# Patient Record
Sex: FEMALE
[De-identification: names, ages, dates, MRNs, and addresses within clinical notes are randomized per-mention and may not be internally consistent; named-entity substitution may affect disease eponyms.]

---

## 2024-09-17 ENCOUNTER — APPOINTMENT (OUTPATIENT)
Dept: ANTEPARTUM | Facility: CLINIC | Age: 34
End: 2024-09-17
Payer: COMMERCIAL

## 2024-09-17 ENCOUNTER — ASOB RESULT (OUTPATIENT)
Age: 34
End: 2024-09-17

## 2024-09-17 PROBLEM — Z00.00 ENCOUNTER FOR PREVENTIVE HEALTH EXAMINATION: Status: ACTIVE | Noted: 2024-09-17

## 2024-09-17 PROCEDURE — 76801 OB US < 14 WKS SINGLE FETUS: CPT | Mod: 59

## 2024-09-17 PROCEDURE — 36415 COLL VENOUS BLD VENIPUNCTURE: CPT

## 2024-09-17 PROCEDURE — 76813 OB US NUCHAL MEAS 1 GEST: CPT

## 2024-11-11 ENCOUNTER — APPOINTMENT (OUTPATIENT)
Dept: ANTEPARTUM | Facility: CLINIC | Age: 34
End: 2024-11-11
Payer: COMMERCIAL

## 2024-11-11 ENCOUNTER — ASOB RESULT (OUTPATIENT)
Age: 34
End: 2024-11-11

## 2024-11-11 PROCEDURE — 76811 OB US DETAILED SNGL FETUS: CPT

## 2025-01-31 ENCOUNTER — APPOINTMENT (OUTPATIENT)
Dept: ANTEPARTUM | Facility: CLINIC | Age: 35
End: 2025-01-31
Payer: COMMERCIAL

## 2025-01-31 ENCOUNTER — ASOB RESULT (OUTPATIENT)
Age: 35
End: 2025-01-31

## 2025-01-31 ENCOUNTER — INPATIENT (INPATIENT)
Facility: HOSPITAL | Age: 35
LOS: 1 days | Discharge: HOME | End: 2025-02-02
Attending: OBSTETRICS & GYNECOLOGY | Admitting: OBSTETRICS & GYNECOLOGY

## 2025-01-31 VITALS
HEART RATE: 106 BPM | SYSTOLIC BLOOD PRESSURE: 138 MMHG | RESPIRATION RATE: 16 BRPM | DIASTOLIC BLOOD PRESSURE: 88 MMHG | TEMPERATURE: 99 F

## 2025-01-31 DIAGNOSIS — O60.00 PRETERM LABOR WITHOUT DELIVERY, UNSPECIFIED TRIMESTER: ICD-10-CM

## 2025-01-31 DIAGNOSIS — Z36.89 ENCOUNTER FOR OTHER SPECIFIED ANTENATAL SCREENING: ICD-10-CM

## 2025-01-31 DIAGNOSIS — O26.899 OTHER SPECIFIED PREGNANCY RELATED CONDITIONS, UNSPECIFIED TRIMESTER: ICD-10-CM

## 2025-01-31 DIAGNOSIS — Z98.891 HISTORY OF UTERINE SCAR FROM PREVIOUS SURGERY: Chronic | ICD-10-CM

## 2025-01-31 LAB
APPEARANCE UR: ABNORMAL
BACTERIA # UR AUTO: ABNORMAL /HPF
BASOPHILS # BLD AUTO: 0.03 K/UL — SIGNIFICANT CHANGE UP (ref 0–0.2)
BASOPHILS NFR BLD AUTO: 0.3 % — SIGNIFICANT CHANGE UP (ref 0–2)
BILIRUB UR-MCNC: NEGATIVE — SIGNIFICANT CHANGE UP
BLD GP AB SCN SERPL QL: NEGATIVE — SIGNIFICANT CHANGE UP
CAST: 3 /LPF — SIGNIFICANT CHANGE UP (ref 0–4)
COLOR SPEC: YELLOW — SIGNIFICANT CHANGE UP
DIFF PNL FLD: ABNORMAL
EOSINOPHIL # BLD AUTO: 0.16 K/UL — SIGNIFICANT CHANGE UP (ref 0–0.5)
EOSINOPHIL NFR BLD AUTO: 1.8 % — SIGNIFICANT CHANGE UP (ref 0–6)
GLUCOSE UR QL: NEGATIVE MG/DL — SIGNIFICANT CHANGE UP
HCT VFR BLD CALC: 36.9 % — SIGNIFICANT CHANGE UP (ref 34.5–45)
HGB BLD-MCNC: 11.9 G/DL — SIGNIFICANT CHANGE UP (ref 11.5–15.5)
IANC: 7.16 K/UL — SIGNIFICANT CHANGE UP (ref 1.8–7.4)
IMM GRANULOCYTES NFR BLD AUTO: 1.1 % — HIGH (ref 0–0.9)
KETONES UR-MCNC: 15 MG/DL
LEUKOCYTE ESTERASE UR-ACNC: ABNORMAL
LYMPHOCYTES # BLD AUTO: 1.08 K/UL — SIGNIFICANT CHANGE UP (ref 1–3.3)
LYMPHOCYTES # BLD AUTO: 11.9 % — LOW (ref 13–44)
MCHC RBC-ENTMCNC: 23.1 PG — LOW (ref 27–34)
MCHC RBC-ENTMCNC: 32.2 G/DL — SIGNIFICANT CHANGE UP (ref 32–36)
MCV RBC AUTO: 71.7 FL — LOW (ref 80–100)
MONOCYTES # BLD AUTO: 0.55 K/UL — SIGNIFICANT CHANGE UP (ref 0–0.9)
MONOCYTES NFR BLD AUTO: 6.1 % — SIGNIFICANT CHANGE UP (ref 2–14)
NEUTROPHILS # BLD AUTO: 7.16 K/UL — SIGNIFICANT CHANGE UP (ref 1.8–7.4)
NEUTROPHILS NFR BLD AUTO: 78.8 % — HIGH (ref 43–77)
NITRITE UR-MCNC: NEGATIVE — SIGNIFICANT CHANGE UP
NRBC # BLD AUTO: 0 K/UL — SIGNIFICANT CHANGE UP (ref 0–0)
NRBC # BLD: 0 /100 WBCS — SIGNIFICANT CHANGE UP (ref 0–0)
NRBC # FLD: 0 K/UL — SIGNIFICANT CHANGE UP (ref 0–0)
NRBC BLD-RTO: 0 /100 WBCS — SIGNIFICANT CHANGE UP (ref 0–0)
PH UR: 6.5 — SIGNIFICANT CHANGE UP (ref 5–8)
PLATELET # BLD AUTO: 261 K/UL — SIGNIFICANT CHANGE UP (ref 150–400)
PROT UR-MCNC: NEGATIVE MG/DL — SIGNIFICANT CHANGE UP
RBC # BLD: 5.15 M/UL — SIGNIFICANT CHANGE UP (ref 3.8–5.2)
RBC # FLD: 14.4 % — SIGNIFICANT CHANGE UP (ref 10.3–14.5)
RBC CASTS # UR COMP ASSIST: 10 /HPF — HIGH (ref 0–4)
REVIEW: SIGNIFICANT CHANGE UP
RH IG SCN BLD-IMP: POSITIVE — SIGNIFICANT CHANGE UP
RH IG SCN BLD-IMP: POSITIVE — SIGNIFICANT CHANGE UP
SP GR SPEC: 1.01 — SIGNIFICANT CHANGE UP (ref 1–1.03)
SQUAMOUS # UR AUTO: 25 /HPF — HIGH (ref 0–5)
T PALLIDUM AB TITR SER: NEGATIVE — SIGNIFICANT CHANGE UP
UROBILINOGEN FLD QL: 0.2 MG/DL — SIGNIFICANT CHANGE UP (ref 0.2–1)
WBC # BLD: 9.08 K/UL — SIGNIFICANT CHANGE UP (ref 3.8–10.5)
WBC # FLD AUTO: 9.08 K/UL — SIGNIFICANT CHANGE UP (ref 3.8–10.5)
WBC UR QL: 53 /HPF — HIGH (ref 0–5)

## 2025-01-31 PROCEDURE — 76816 OB US FOLLOW-UP PER FETUS: CPT | Mod: 26

## 2025-01-31 PROCEDURE — 76819 FETAL BIOPHYS PROFIL W/O NST: CPT | Mod: 26,59

## 2025-01-31 RX ORDER — OXYTOCIN/0.9 % SODIUM CHLORIDE 10/500ML
167 PLASTIC BAG, INJECTION (ML) INTRAVENOUS
Qty: 30 | Refills: 0 | Status: DISCONTINUED | OUTPATIENT
Start: 2025-01-31 | End: 2025-02-02

## 2025-01-31 RX ORDER — SODIUM CHLORIDE 9 G/ML
500 INJECTION, SOLUTION INTRAVENOUS ONCE
Refills: 0 | Status: COMPLETED | OUTPATIENT
Start: 2025-01-31 | End: 2025-01-31

## 2025-01-31 RX ORDER — BETAMETHASONE ACETATE,SOD PHOS 6 MG/ML
12 VIAL (ML) INJECTION EVERY 24 HOURS
Refills: 0 | Status: DISCONTINUED | OUTPATIENT
Start: 2025-01-31 | End: 2025-02-02

## 2025-01-31 RX ORDER — SODIUM CHLORIDE 9 G/ML
1000 INJECTION, SOLUTION INTRAVENOUS
Refills: 0 | Status: DISCONTINUED | OUTPATIENT
Start: 2025-01-31 | End: 2025-02-01

## 2025-01-31 RX ORDER — NIFEDIPINE 90 MG/1
20 TABLET, FILM COATED, EXTENDED RELEASE ORAL EVERY 6 HOURS
Refills: 0 | Status: DISCONTINUED | OUTPATIENT
Start: 2025-01-31 | End: 2025-02-02

## 2025-01-31 RX ORDER — ANTISEPTIC SURGICAL SCRUB 0.04 MG/ML
1 SOLUTION TOPICAL DAILY
Refills: 0 | Status: DISCONTINUED | OUTPATIENT
Start: 2025-01-31 | End: 2025-02-02

## 2025-01-31 RX ORDER — BETAMETHASONE ACETATE,SOD PHOS 6 MG/ML
12 VIAL (ML) INJECTION EVERY 24 HOURS
Refills: 0 | Status: DISCONTINUED | OUTPATIENT
Start: 2025-01-31 | End: 2025-01-31

## 2025-01-31 RX ORDER — SODIUM CHLORIDE 9 G/ML
1000 INJECTION, SOLUTION INTRAVENOUS ONCE
Refills: 0 | Status: COMPLETED | OUTPATIENT
Start: 2025-01-31 | End: 2025-01-31

## 2025-01-31 RX ORDER — OXYTOCIN/0.9 % SODIUM CHLORIDE 10/500ML
PLASTIC BAG, INJECTION (ML) INTRAVENOUS
Qty: 30 | Refills: 0 | Status: DISCONTINUED | OUTPATIENT
Start: 2025-01-31 | End: 2025-01-31

## 2025-01-31 RX ORDER — SODIUM CHLORIDE 9 G/ML
1000 INJECTION, SOLUTION INTRAVENOUS
Refills: 0 | Status: DISCONTINUED | OUTPATIENT
Start: 2025-01-31 | End: 2025-01-31

## 2025-01-31 RX ORDER — ANTISEPTIC SURGICAL SCRUB 0.04 MG/ML
1 SOLUTION TOPICAL DAILY
Refills: 0 | Status: DISCONTINUED | OUTPATIENT
Start: 2025-01-31 | End: 2025-01-31

## 2025-01-31 RX ADMIN — SODIUM CHLORIDE 1000 MILLILITER(S): 9 INJECTION, SOLUTION INTRAVENOUS at 17:03

## 2025-01-31 RX ADMIN — Medication 200 GRAM(S): at 13:14

## 2025-01-31 RX ADMIN — ANTISEPTIC SURGICAL SCRUB 1 APPLICATION(S): 0.04 SOLUTION TOPICAL at 15:00

## 2025-01-31 RX ADMIN — NIFEDIPINE 20 MILLIGRAM(S): 90 TABLET, FILM COATED, EXTENDED RELEASE ORAL at 13:33

## 2025-01-31 RX ADMIN — Medication 12 MILLIGRAM(S): at 13:25

## 2025-01-31 RX ADMIN — NIFEDIPINE 20 MILLIGRAM(S): 90 TABLET, FILM COATED, EXTENDED RELEASE ORAL at 19:55

## 2025-01-31 RX ADMIN — SODIUM CHLORIDE 1000 MILLILITER(S): 9 INJECTION, SOLUTION INTRAVENOUS at 21:30

## 2025-01-31 RX ADMIN — Medication 200 GRAM(S): at 19:56

## 2025-01-31 NOTE — CONSULT NOTE PEDS - SUBJECTIVE AND OBJECTIVE BOX
Ms. Nj is a 36 y/o  admitted at 32w0d gestational age, admitted in pre-term labor. No reported maternal history and prenatal history notable for previous . Most recent EFW 1616g (39th %ile) on . She is receiving betamethasone (first dose given ).    I met with Ms. Nj and her partner and discussed what to expect should she deliver at 32 weeks gestation. We discussed the followin. The NICU team will be present at her delivery and will immediately assess and care for her infant.    2. The infant may require respiratory support, most commonly in the form of nasal CPAP. While unlikely, there is a small possibility that the infant would require intubation and mechanical ventilation.    3. Depending on the clinical status of the infant, enteral feedings may or may not be started immediately. The infant will receive IVF/IV nutrition as necessary. Due to immature suck/swallow, orogastric or nasogastric tube may be required once feeds are initiated. The infant is also at risk for hypoglycemia due to prematurity.    4. Discussed the benefits of breastfeeding in  infants and encouraged mother to pump following delivery.    5. Due to prematurity, the infant is at increased risk for jaundice, which can be treated with phototherapy.    6. The infant will be screened for infection and treated with antibiotics if deemed clinically necessary.    7. The infant is at risk for developmental delays as a consequence of prematurity. The infant will be evaluated by a developmental pediatrician to monitor for neurodevelopmental delays.    8. Thermoregulation issues and need to be in an isolette with slow weaning to a crib discussed.    9. The infant is at risk for developmental delays as a consequence of prematurity. The infant will be evaluated by a developmental pediatrician to monitor for neurodevelopmental delays.    10. Length of stay is highly variable, but given the infant’s weight and gestational age, the family may expect an average stay of about 3 weeks. Reviewed discharge criteria.    Ms. Nj and her partner had the opportunity to ask questions and may contact the NICU at any time if further questions arise.    Thank you for the opportunity to participate in the care of this patient and please inform us of any changes in her status.

## 2025-01-31 NOTE — OB PROVIDER TRIAGE NOTE - NSOBPROVIDERNOTE_OBGYN_ALL_OB_FT
36 y/o  @ 32 wks gestation presents with vaginal spotting:   plan of care d/w dr Vines   pt to be admitted to L&D   PTL @ 32 wks gestation for beta/ Ampicillin/ Procardia / NICU consult / desires TOLAC   see admission orders

## 2025-01-31 NOTE — CHART NOTE - NSCHARTNOTEFT_GEN_A_CORE
Patient seen at bedside for reeval including repeat VE. Patient feeling intermittent, nonpainful contractions. No additional episodes of vaginal bleeding.     SVE 3/60/-3 from 3 at approx 11am  FHT remains reactive and reassuring, toco with irregular contractions    Patient discussed on [facility] safety rounds. In the setting of small cervical change, still would not recommend proceeding with  section at this time even if patient was averse to TOLAC. Ultimately, patient is interested in TOLAC. Will continue tocolysis with Procardia q6hrs to reach time for second dose of BMZ (135pm, ). Continue Amp for GBS prophylaxis. Will only plan for repeat VE PRN for symptoms of increased cramping/contractions or vaginal bleeding. CEFM/toco. Clear liquid diet. TOLAC consents signed.    Discussed with M attending, Dr. Quiñones  Discussed with attending, Dr. Tiki Mckeon PGY3

## 2025-01-31 NOTE — OB PROVIDER TRIAGE NOTE - NS_TRIAGEMEDICALSCREENINGPERFORMEDBY_OBGYN_ALL_OB_FT
Ambulatory Care Coordination Note  10/10/2023    Patient Current Location:  Home: [address] [place] [id number]     ACM contacted the patient by telephone. Verified name and  with patient as identifiers. Provided introduction to self, and explanation of the ACM role. Challenges to be reviewed by the provider   Additional needs identified to be addressed with provider: No  Patient has been linked with Diabetes Education               Method of communication with provider: chart routing. ACM: John Paul Talley RN    Patient has been linked with Janet Riley - DYANA Diabetes Educator. Plan to address:   - use of CGM (Dexcom?)   - use of Glucometer (may need replacement)   - nutrition education    Offered patient enrollment in the Remote Patient Monitoring (RPM) program for in-home monitoring: Yes, but did not enroll at this time. Lab Results       None                 Goals Addressed                   This Visit's Progress     Patient verbalizes understanding of self -management goals of living with Diabetes.    On track     ACM to support in self management of diabetes   - may encourage a return to diabetes classes   - weekly call with patient to ascertain blood sugar trends   - involve SW as needed to support insulin affordability              Future Appointments   Date Time Provider [address]   10/30/2023 10:00 AM Kaelyn Silva RD SFODTR [facility]   2023  2:00 PM Robert Horn MD MLMIM [facility] AMB   2024 11:30 AM FELIZ Dinero [facility] AMB   2024 10:30 AM Luis Enrique Harris MD BSNI [facility] AMB marty

## 2025-01-31 NOTE — OB PROVIDER TRIAGE NOTE - NS_CTXBYPALP_OBGYN_ALL_OB
Mild/Good Relaxation between Contractions Imiquimod Counseling:  I discussed with the patient the risks of imiquimod including but not limited to erythema, scaling, itching, weeping, crusting, and pain.  Patient understands that the inflammatory response to imiquimod is variable from person to person and was educated regarded proper titration schedule.  If flu-like symptoms develop, patient knows to discontinue the medication and contact us.

## 2025-01-31 NOTE — OB PROVIDER H&P - HISTORY OF PRESENT ILLNESS
PNC with WHP  34 y/o  @ 32 wks gestation presents with c/o episode of pink tinge @ 2300 and then again @ 0715 noticed red spot on toilet paper and brown discharge @ 0830 denies any recent intercourse denies any hematuria or dysuria denies any uc's or lof reports +FM denies any n/v/d denies any fever or chills ap care comp by :   desires a TOLAC  scheduled repeat  if no labor 4/3/2025

## 2025-01-31 NOTE — OB PROVIDER TRIAGE NOTE - HISTORY OF PRESENT ILLNESS
PNC with WHP  36 y/o  @ 32 wks gestation presents with c/o episode of pink tinge @ 2300 and then again @ 0715 noticed red spot on toilet paper and brown discharge @ 0830 denies any recent intercourse denies any hematuria or dysuria denies any uc's or lof reports +FM denies any n/v/d denies any fever or chills ap care comp by :   desires a TOLAC  scheduled repeat  if no labor 4/3/2025

## 2025-01-31 NOTE — OB PROVIDER H&P - NS_TUBALPLANNED_OBGYN_ALL_OB
Please schedule mammogram, you may do this today or call their contact number at [phone]. They are located at [address] [place] [address], [place] [address]    Fasting labwork is due to recheck your cholesterol.  Your current 10 yr heart disease risk is 6.5%.    Complete the cologuard test kit--please call the insurance for coverage.    Icy hot topical to the area  Ibuprofen up to 3 times daily for a few days.  Massage    Recheck as needed  
No

## 2025-01-31 NOTE — OB PROVIDER TRIAGE NOTE - NSHPPHYSICALEXAM_GEN_ALL_CORE
abdomen: soft, nt on palp  chaperone by Mariaelena Gómez RN   SSE scant amount pink red brown discharge noted   cervix appears ~ 2 cm   SVE: 2/60/-3  NST reactive  FH baseline 140 FH variability mod +FH accels no FH decels   toco: every 4-5 minutes   sono done by Elizabeth BERRY   BPP: 8/8 cephalic posterior placenta JERSEY: 12.7 EFW: 1616 grams 39%  T(C): 37 (01-31-25 @ 10:21), Max: 37 (01-31-25 @ 10:21)  HR: 81 (01-31-25 @ 12:32) (81 - 106)  BP: 121/63 (01-31-25 @ 12:32) (107/64 - 138/88)  RR: 16 (01-31-25 @ 10:21) (16 - 16)  SpO2: --

## 2025-02-01 LAB
APTT BLD: 26.9 SEC — SIGNIFICANT CHANGE UP (ref 24.5–35.6)
BASOPHILS # BLD AUTO: 0.01 K/UL — SIGNIFICANT CHANGE UP (ref 0–0.2)
BASOPHILS NFR BLD AUTO: 0.1 % — SIGNIFICANT CHANGE UP (ref 0–2)
EOSINOPHIL # BLD AUTO: 0 K/UL — SIGNIFICANT CHANGE UP (ref 0–0.5)
EOSINOPHIL NFR BLD AUTO: 0 % — SIGNIFICANT CHANGE UP (ref 0–6)
FIBRINOGEN PPP-MCNC: 584 MG/DL — HIGH (ref 200–465)
HCT VFR BLD CALC: 34.9 % — SIGNIFICANT CHANGE UP (ref 34.5–45)
HGB BLD-MCNC: 11.1 G/DL — LOW (ref 11.5–15.5)
IANC: 10.6 K/UL — HIGH (ref 1.8–7.4)
IMM GRANULOCYTES NFR BLD AUTO: 1.2 % — HIGH (ref 0–0.9)
INR BLD: 0.91 RATIO — SIGNIFICANT CHANGE UP (ref 0.85–1.16)
LYMPHOCYTES # BLD AUTO: 0.87 K/UL — LOW (ref 1–3.3)
LYMPHOCYTES # BLD AUTO: 7.2 % — LOW (ref 13–44)
MCHC RBC-ENTMCNC: 23 PG — LOW (ref 27–34)
MCHC RBC-ENTMCNC: 31.8 G/DL — LOW (ref 32–36)
MCV RBC AUTO: 72.4 FL — LOW (ref 80–100)
MONOCYTES # BLD AUTO: 0.47 K/UL — SIGNIFICANT CHANGE UP (ref 0–0.9)
MONOCYTES NFR BLD AUTO: 3.9 % — SIGNIFICANT CHANGE UP (ref 2–14)
NEUTROPHILS # BLD AUTO: 10.6 K/UL — HIGH (ref 1.8–7.4)
NEUTROPHILS NFR BLD AUTO: 87.6 % — HIGH (ref 43–77)
NRBC # BLD AUTO: 0 K/UL — SIGNIFICANT CHANGE UP (ref 0–0)
NRBC # BLD: 0 /100 WBCS — SIGNIFICANT CHANGE UP (ref 0–0)
NRBC # FLD: 0 K/UL — SIGNIFICANT CHANGE UP (ref 0–0)
NRBC BLD-RTO: 0 /100 WBCS — SIGNIFICANT CHANGE UP (ref 0–0)
PLATELET # BLD AUTO: 270 K/UL — SIGNIFICANT CHANGE UP (ref 150–400)
PROTHROM AB SERPL-ACNC: 10.8 SEC — SIGNIFICANT CHANGE UP (ref 9.9–13.4)
RBC # BLD: 4.82 M/UL — SIGNIFICANT CHANGE UP (ref 3.8–5.2)
RBC # FLD: 14.4 % — SIGNIFICANT CHANGE UP (ref 10.3–14.5)
WBC # BLD: 12.1 K/UL — HIGH (ref 3.8–10.5)
WBC # FLD AUTO: 12.1 K/UL — HIGH (ref 3.8–10.5)

## 2025-02-01 RX ORDER — HEPARIN SODIUM,PORCINE 10000/ML
5000 VIAL (ML) INJECTION EVERY 12 HOURS
Refills: 0 | Status: DISCONTINUED | OUTPATIENT
Start: 2025-02-01 | End: 2025-02-02

## 2025-02-01 RX ADMIN — NIFEDIPINE 20 MILLIGRAM(S): 90 TABLET, FILM COATED, EXTENDED RELEASE ORAL at 20:01

## 2025-02-01 RX ADMIN — Medication 200 GRAM(S): at 15:57

## 2025-02-01 RX ADMIN — Medication 12 MILLIGRAM(S): at 14:00

## 2025-02-01 RX ADMIN — Medication 200 GRAM(S): at 02:12

## 2025-02-01 RX ADMIN — Medication 200 GRAM(S): at 21:57

## 2025-02-01 RX ADMIN — NIFEDIPINE 20 MILLIGRAM(S): 90 TABLET, FILM COATED, EXTENDED RELEASE ORAL at 14:00

## 2025-02-01 RX ADMIN — ANTISEPTIC SURGICAL SCRUB 1 APPLICATION(S): 0.04 SOLUTION TOPICAL at 18:03

## 2025-02-01 RX ADMIN — Medication 200 GRAM(S): at 10:00

## 2025-02-01 RX ADMIN — NIFEDIPINE 20 MILLIGRAM(S): 90 TABLET, FILM COATED, EXTENDED RELEASE ORAL at 05:48

## 2025-02-01 NOTE — CHART NOTE - NSCHARTNOTEFT_GEN_A_CORE
-Patient with no contraction pain with Procardia for tocolysis at 8 -Patient with no contraction pain with Procardia for tocolysis at 8pm. Patient examined and still 3/60/-3. Plan for antepartum admission with NST BID and reassessment of labor sxs prn.   DTaveras PGY3

## 2025-02-01 NOTE — PROGRESS NOTE ADULT - ASSESSMENT
Patient is a 34yo  at 32w1d who initially presented with vaginal bleeding and contractions on tocometer, found to be 2/50/-3 on initial VE. Patient admitted for BMZ, tocolysis and continued observation. Patient with cervical change to 3/60/-3 with plan for expectant management and continued tocolysis. Overnight patient on CEFM/[place] with less contractions noted, patient remained comfortable.    #PTL  - 2/50/-3 ( at 1130am) -> 3/60/-3 ( at 8pm)  - Continue tocolysis with Procardia 20mg IR q6hrs  - CEFM/[place]    #Fetal well-being  - BMZ #2 @125pm  - Amp for GBS prophylaxis  - No indication for Mg    #Maternal well-being  - Clear liquid diet  - Holding HSQ    Molly Mckeon PGY3

## 2025-02-01 NOTE — CHART NOTE - NSCHARTNOTEFT_GEN_A_CORE
patient assessed at bedside, comfortable. advised on PTL and to notify if feeling increased pressure. continue monitoring. reassuring fetal tracing at this time

## 2025-02-02 VITALS
HEART RATE: 107 BPM | OXYGEN SATURATION: 98 % | RESPIRATION RATE: 16 BRPM | SYSTOLIC BLOOD PRESSURE: 98 MMHG | TEMPERATURE: 98 F | DIASTOLIC BLOOD PRESSURE: 59 MMHG

## 2025-02-02 DIAGNOSIS — O20.8 OTHER HEMORRHAGE IN EARLY PREGNANCY: ICD-10-CM

## 2025-02-02 LAB
CULTURE RESULTS: SIGNIFICANT CHANGE UP
HBV SURFACE AG SER-ACNC: SIGNIFICANT CHANGE UP
SPECIMEN SOURCE: SIGNIFICANT CHANGE UP

## 2025-02-02 RX ADMIN — Medication 5000 UNIT(S): at 10:44

## 2025-02-02 RX ADMIN — Medication 200 GRAM(S): at 02:13

## 2025-02-02 RX ADMIN — NIFEDIPINE 20 MILLIGRAM(S): 90 TABLET, FILM COATED, EXTENDED RELEASE ORAL at 09:01

## 2025-02-02 RX ADMIN — NIFEDIPINE 20 MILLIGRAM(S): 90 TABLET, FILM COATED, EXTENDED RELEASE ORAL at 02:00

## 2025-02-02 NOTE — DISCHARGE NOTE ANTEPARTUM - CARE PROVIDERS DIRECT ADDRESSES
[email or web address]

## 2025-02-02 NOTE — DISCHARGE NOTE ANTEPARTUM - PATIENT PORTAL LINK FT
You can access the FollowMyHealth Patient Portal offered by [facility] by registering at the following website: [email or web address]. By joining [employer]’s FollowMyHealth portal, you will also be able to view your health information using other applications (apps) compatible with our system.

## 2025-02-02 NOTE — DISCHARGE NOTE ANTEPARTUM - FINANCIAL ASSISTANCE
[facility] provides services at a reduced cost to those who are determined to be eligible through [facility]’s financial assistance program. Information regarding [facility]’s financial assistance program can be found by going to [email or web address] or by calling [phone].

## 2025-02-02 NOTE — PROGRESS NOTE ADULT - ASSESSMENT
Patient is a 34yo  at 32w2d who initially presented with vaginal bleeding and contractions on tocometer, found to be 2/50/-3 on initial VE. Patient admitted for BMZ, tocolysis and continued observation. Patient with cervical change to 3/60/-3 with plan for expectant management and continued tocolysis. Patient with stable exam overnight and cleared for ante with reactive NST and still on Procardia.     #PTL  - 2/50/-3 ( at 1130am) -> 3/60/-3 ( at 8pm)->3/60/-3()  - Continue tocolysis with Procardia 20mg IR q6hrs; consider dc today  - CEFM/[place]    #Fetal well-being  - BMZ(-)  - s/p Amp(-),  - No indication for Mg    #Maternal well-being  - Reg  - HSQ    DTaveras PGY3   Patient is a 34yo  at 32w2d who initially presented with vaginal bleeding and contractions on tocometer, found to be 2/50/-3 on initial VE. Patient admitted for BMZ, tocolysis and continued observation. Patient with cervical change to 3/60/-3 with plan for expectant management and continued tocolysis. Patient with stable exam overnight and cleared for ante with reactive NST and still on Procardia.     #PTL  - 2/50/-3 ( at 1130am) -> 3/60/-3 ( at 8pm)->3/60/-3()  - Continue tocolysis with Procardia 20mg IR q6hrs; consider dc today  - CEFM/[place]    #Fetal well-being  - BMZ(-)  - s/p Amp(-),  - No indication for Mg    #Maternal well-being  - Reg  - HSQ  []f/u Hep B, GC  []f/u GBS, GCT, Ucx ()    DTaveras PGY3

## 2025-02-02 NOTE — DISCHARGE NOTE ANTEPARTUM - MEDICATION SUMMARY - MEDICATIONS TO TAKE
I will START or STAY ON the medications listed below when I get home from the hospital:    PNV  -- Indication: For Pregnancy

## 2025-02-02 NOTE — DISCHARGE NOTE ANTEPARTUM - CARE PLAN
Principal Discharge DX:	 labor  Assessment and plan of treatment:	You received betamethasone from -   1

## 2025-02-02 NOTE — DISCHARGE NOTE ANTEPARTUM - CARE PROVIDER_API CALL
Juana Vines  Obstetrics and Gynecology  [address] [place] [id number]  Phone: [phone]  Follow Up Time:

## 2025-02-02 NOTE — PROGRESS NOTE ADULT - SUBJECTIVE AND OBJECTIVE BOX
Antepartum Progress Note    Patient seen and examined at bedside. Patient comfortable overnight. Continues with Procardia tocolysis. No acute complaints. Patient endorses good fetal movement. Patient is ambulating and tolerating CLD. Denies CP, SOB, N/V, fevers, chills, or any other concerns.    Vital Signs Last 24 Hours  T(C): 36.9 (01-31-25 @ 23:46), Max: 37 (01-31-25 @ 10:21)  HR: 87 (02-01-25 @ 04:55) (81 - 133)  BP: 98/53 (02-01-25 @ 04:41) (85/51 - 138/88)  RR: 15 (01-31-25 @ 23:46) (15 - 18)  SpO2: 92% (02-01-25 @ 04:54) (92% - 98%)    I&O's Summary    31 Jan 2025 07:01  -  01 Feb 2025 04:57  --------------------------------------------------------  IN: 2975 mL / OUT: 3 mL / NET: 2972 mL      Physical Exam:  General: NAD  CV: RR  Lungs: breathing comfortably on RA  Abdomen: soft, gravid, non-tender  Ext: no pain or swelling    Labs:             11.9   9.08  )-----------( 261      ( 01-31 @ 13:51 )             36.9       MEDICATIONS  (STANDING):  ampicillin  IVPB 2 Gram(s) IV Intermittent every 6 hours  betamethasone Injectable 12 milliGRAM(s) IntraMuscular every 24 hours  chlorhexidine 2% Cloths 1 Application(s) Topical daily  lactated ringers. 1000 milliLiter(s) (125 mL/Hr) IV Continuous <Continuous>  NIFEdipine IR 20 milliGRAM(s) Oral every 6 hours  oxytocin Infusion 167 milliUNIT(s)/Min (167 mL/Hr) IV Continuous <Continuous>    MEDICATIONS  (PRN):  
R3 Antepartum Note, HD#    Patient seen and examined at bedside, no acute overnight events. No acute complaints. Pt reports +FM, denies LOF, VB, ctx, HA, epigastric pain, blurred vision, CP, SOB, N/V, fevers, and chills.    Vital Signs Last 24 Hours  T(C): 36.9 (02-02-25 @ 02:00), Max: 37.0 (02-01-25 @ 10:00)  HR: 101 (02-02-25 @ 02:00) (80 - 134)  BP: 106/58 (02-02-25 @ 02:00) (84/50 - 121/70)  RR: 16 (02-02-25 @ 02:00) (15 - 17)  SpO2: 94% (02-01-25 @ 20:58) (92% - 100%)    CAPILLARY BLOOD GLUCOSE          Physical Exam:  General: NAD  Abdomen: Soft, non-tender, gravid  Ext: No pain or swelling    NST reactive overnight    Labs:             11.1   12.10 )-----------( 270      ( 02-01 @ 06:10 )             34.9           PT/INR - ( 02-01 @ 06:10 )   PT: 10.8 sec;   INR: 0.91 ratio    PTT - ( 02-01 @ 06:10 )  PTT:26.9 sec        MEDICATIONS  (STANDING):  ampicillin  IVPB 2 Gram(s) IV Intermittent every 6 hours  betamethasone Injectable 12 milliGRAM(s) IntraMuscular every 24 hours  chlorhexidine 2% Cloths 1 Application(s) Topical daily  heparin   Injectable 5000 Unit(s) SubCutaneous every 12 hours  NIFEdipine IR 20 milliGRAM(s) Oral every 6 hours  oxytocin Infusion 167 milliUNIT(s)/Min (167 mL/Hr) IV Continuous <Continuous>    MEDICATIONS  (PRN):

## 2025-02-02 NOTE — CHART NOTE - NSCHARTNOTEFT_GEN_A_CORE
Patient examined at bedside. Denies contractions, vaginal bleeding, LOF. +FM. VE performed and patient remains 3/60/-3. Patient clear for discharge    d/w Dr. Anton Lucas, PGY3 Patient examined at bedside. Denies contractions, vaginal bleeding, LOF. +FM. VE performed and patient remains 3/60/-3. Patient clear for discharge    d/w Dr. Anton Lucas, PGY3    Pt seen at [facility]. All qs answered. Agree with above assessment - NE

## 2025-02-02 NOTE — DISCHARGE NOTE ANTEPARTUM - HOSPITAL COURSE
34yo  admitted at 32w0d who initially presented with vaginal bleeding and contractions on tocometer, found to be 2/50/-3 on initial VE. Patient admitted for BMZ, tocolysis and continued observation. Patient with cervical change to 3/60/-3 with plan for expectant management and continued tocolysis. Patient with no contractions and VE unchanged at the time of discharge.  labor precautions given. Patient to follow closely with [facility].

## 2025-02-03 LAB
C TRACH RRNA SPEC QL NAA+PROBE: SIGNIFICANT CHANGE UP
CULTURE RESULTS: SIGNIFICANT CHANGE UP
N GONORRHOEA RRNA SPEC QL NAA+PROBE: SIGNIFICANT CHANGE UP
SPECIMEN SOURCE: SIGNIFICANT CHANGE UP

## 2025-03-14 PROBLEM — Z78.9 OTHER SPECIFIED HEALTH STATUS: Chronic | Status: ACTIVE | Noted: 2025-01-31

## 2025-03-28 ENCOUNTER — OUTPATIENT (OUTPATIENT)
Dept: OUTPATIENT SERVICES | Facility: HOSPITAL | Age: 35
LOS: 1 days | End: 2025-03-28

## 2025-03-28 VITALS
HEART RATE: 100 BPM | RESPIRATION RATE: 18 BRPM | OXYGEN SATURATION: 97 % | SYSTOLIC BLOOD PRESSURE: 120 MMHG | HEIGHT: 65 IN | TEMPERATURE: 98 F | DIASTOLIC BLOOD PRESSURE: 75 MMHG | WEIGHT: 175.05 LBS

## 2025-03-28 DIAGNOSIS — Z98.891 HISTORY OF UTERINE SCAR FROM PREVIOUS SURGERY: Chronic | ICD-10-CM

## 2025-03-28 LAB
APPEARANCE UR: CLEAR — SIGNIFICANT CHANGE UP
BACTERIA # UR AUTO: NEGATIVE /HPF — SIGNIFICANT CHANGE UP
BASOPHILS # BLD AUTO: 0.03 K/UL — SIGNIFICANT CHANGE UP (ref 0–0.2)
BASOPHILS NFR BLD AUTO: 0.4 % — SIGNIFICANT CHANGE UP (ref 0–2)
BILIRUB UR-MCNC: NEGATIVE — SIGNIFICANT CHANGE UP
BLD GP AB SCN SERPL QL: NEGATIVE — SIGNIFICANT CHANGE UP
CAST: 0 /LPF — SIGNIFICANT CHANGE UP (ref 0–4)
COLOR SPEC: YELLOW — SIGNIFICANT CHANGE UP
DIFF PNL FLD: NEGATIVE — SIGNIFICANT CHANGE UP
EOSINOPHIL # BLD AUTO: 0.11 K/UL — SIGNIFICANT CHANGE UP (ref 0–0.5)
EOSINOPHIL NFR BLD AUTO: 1.5 % — SIGNIFICANT CHANGE UP (ref 0–6)
GLUCOSE UR QL: NEGATIVE MG/DL — SIGNIFICANT CHANGE UP
HCT VFR BLD CALC: 38.5 % — SIGNIFICANT CHANGE UP (ref 34.5–45)
HGB BLD-MCNC: 12.4 G/DL — SIGNIFICANT CHANGE UP (ref 11.5–15.5)
IMM GRANULOCYTES NFR BLD AUTO: 0.7 % — SIGNIFICANT CHANGE UP (ref 0–0.9)
KETONES UR-MCNC: NEGATIVE MG/DL — SIGNIFICANT CHANGE UP
LEUKOCYTE ESTERASE UR-ACNC: ABNORMAL
LYMPHOCYTES # BLD AUTO: 0.89 K/UL — LOW (ref 1–3.3)
LYMPHOCYTES # BLD AUTO: 12.1 % — LOW (ref 13–44)
MCHC RBC-ENTMCNC: 23 PG — LOW (ref 27–34)
MCHC RBC-ENTMCNC: 32.2 G/DL — SIGNIFICANT CHANGE UP (ref 32–36)
MCV RBC AUTO: 71.6 FL — LOW (ref 80–100)
MONOCYTES # BLD AUTO: 0.61 K/UL — SIGNIFICANT CHANGE UP (ref 0–0.9)
MONOCYTES NFR BLD AUTO: 8.3 % — SIGNIFICANT CHANGE UP (ref 2–14)
NEUTROPHILS # BLD AUTO: 5.69 K/UL — SIGNIFICANT CHANGE UP (ref 1.8–7.4)
NEUTROPHILS NFR BLD AUTO: 77 % — SIGNIFICANT CHANGE UP (ref 43–77)
NITRITE UR-MCNC: NEGATIVE — SIGNIFICANT CHANGE UP
PH UR: 6 — SIGNIFICANT CHANGE UP (ref 5–8)
PLATELET # BLD AUTO: 247 K/UL — SIGNIFICANT CHANGE UP (ref 150–400)
PROT UR-MCNC: NEGATIVE MG/DL — SIGNIFICANT CHANGE UP
RBC # BLD: 5.38 M/UL — HIGH (ref 3.8–5.2)
RBC # FLD: 15.2 % — HIGH (ref 10.3–14.5)
RBC CASTS # UR COMP ASSIST: 0 /HPF — SIGNIFICANT CHANGE UP (ref 0–4)
REVIEW: SIGNIFICANT CHANGE UP
RH IG SCN BLD-IMP: POSITIVE — SIGNIFICANT CHANGE UP
SP GR SPEC: 1.01 — SIGNIFICANT CHANGE UP (ref 1–1.03)
SQUAMOUS # UR AUTO: 13 /HPF — HIGH (ref 0–5)
UROBILINOGEN FLD QL: 0.2 MG/DL — SIGNIFICANT CHANGE UP (ref 0.2–1)
WBC # BLD: 7.38 K/UL — SIGNIFICANT CHANGE UP (ref 3.8–10.5)
WBC # FLD AUTO: 7.38 K/UL — SIGNIFICANT CHANGE UP (ref 3.8–10.5)
WBC UR QL: 17 /HPF — HIGH (ref 0–5)

## 2025-03-28 NOTE — OB PST NOTE - PROBLEM SELECTOR PLAN 1
Schedule for repeat  section tentatively on 2025. Pre op instructions, chlorhexidine gluconate soap given and explained. Pt verbalized understanding.

## 2025-03-28 NOTE — OB PST NOTE - HISTORY OF PRESENT ILLNESS
34 y/o F 40 weeks, , NIKOS 25+ FM during PST.   Denies abnormal vaginal bleeding or leaking 34 y/o F 40 weeks gestation, , NIKOS 25 presents for pre op evaluation for repeat  section tentatively on 25. Pt reports + FM during PST during PST.   Denies abnormal vaginal bleeding or leaking

## 2025-03-28 NOTE — OB PST NOTE - NSHPREVIEWOFSYSTEMS_GEN_ALL_CORE
General: No fever, chills, sweating, anorexia, weight loss or weight gain. No polyphagia, polyurea, polydypsia, malaise, or fatigue    Skin: No rashes, itching, or dryness. No change in size/color of moles. No tumors, brittle nails, pitted nails, or hair loss    Breast: No tenderness, lumps, or nipple discharge      Ophthalmologic: + contact lenses  No diplopia, photophobia, lacrimation, blurred Vision , or eye discharge    ENMT Symptoms: + mild nasal congestion, + clear mild runny nose   No hearing difficulty, ear pain, tinnitus, or vertigo. No sinus symptoms, nasal congestion, nasal   discharge, or nasal obstruction    Respiratory and Thorax: No wheezing, dyspnea, cough, hemoptysis, or pleuritic chest pain     Cardiovascular: No chest pain, palpitations, dyspnea on exertion, orthopnea, paroxysmal nocturnal dyspnea,   peripheral edema, or claudication    Gastrointestinal: No nausea, vomiting, diarrhea, constipation, change in bowel habits, flatulence, abdominal pain, or melena    Genitourinary/ Pelvis: No hematuria, renal colic, or flank pain.  No urine discoloration, incontinence, dysuria, or urinary hesitancy. Increased urinary frequency. No nocturia, abnormal vaginal bleeding, vaginal discharge, spotting, pelvic pain, or vaginal leakage    Musculoskeletal: No arthralgia, arthritis, joint swelling, muscle cramping, muscle weakness, neck pain, arm pain, or leg pain    Neurological: No transient paralysis, weakness, paresthesias, or seizures. No syncope, tremors, vertigo, loss of sensation, difficulty walking, loss of consciousness, hemiparesis, confusion, or facial palsy    Psychiatric: No suicidal ideation, depression, anxiety, insomnia, memory loss, paranoia, mood swings, agitation, hallucinations, or hyperactivity    Hematology: No gum bleeding, nose bleeding, or skin lumps    Lymphatic: No enlarged or tender lymph nodes. + lower extremity swelling    Endocrine: No heat or cold intolerance    Immunologic: No recurrent or persistent infections

## 2025-03-28 NOTE — OB PST NOTE - NSHPPHYSICALEXAM_GEN_ALL_CORE
Constitutional: Well Developed, Well Groomed, Well Nourished, No Distress    Eyes: PERRL, EOMI, conjunctiva clear    Ears: Normal    Mouth & Gums: Normal, moist    Pharynx: No tenderness, discharge, or peritonsillar abscess    Tonsils: No Redness, discharge, tenderness, or swelling    Neck: Supple, no JVD, normal thyroid glands, no carotid bruits, no cervical vertebral or paraspinal tenderness    Breast: Normal shape, no masses, no tenderness, nipples normal, no nipple discharge    Back: Normal shape, ROM intact, strength intact, no vertebral tenderness    Respiratory: Airway patent, breath sounds equal, good air movement, respiration non-labored, clear to auscultation bilateral, no chest wall tenderness, no intercostal retractions, no rales, no wheezes, no rhonchi, no subcutaneous emphysema    Cardiovascular:  Regular rate and rhythm, no rubs or murmur, normal PMI    Gastrointestinal: Soft, non-tender, non distention, no masses palpable, bowel sound normal, no bruit, no rebound tenderness    Extremities: No clubbing, cyanosis, or pedal edema    Vascular:  Carotid Pulse normal , Radial Pulse normal, Femoral Pulse normal, DP pulse normal, PT pulse normal    Neurological: alert & oriented x 3, sensation intact, deep reflexes intact, cranial nerve intact, normal strength    Skin: warm and dry, normal color    Lymph Nodes: normal posterior cervical lymph node, normal anterior cervical lymph node, normal supraclavicular lymph node, normal axillary lymph node, normal inguinal lymph node, normal femoral lymph node    Musculoskeletal: BLE mild edema  ROM intact, warmth, or calf tenderness. Normal strength    Psychiatric: normal affect, normal behavior Constitutional: Well Developed, Well Nourished, No Distress    Eyes: PERRL, EOMI, conjunctiva clear    Ears: Normal    Mouth & Gums: Normal, moist    Neck: Supple, no JVD, normal thyroid glands    Breast: Normal shape    Back: Normal shape, ROM intact    Respiratory: Airway patent, breath sounds equal, good air movement, respiration non-labored, clear to auscultation bilateral, no chest wall tenderness, no intercostal retractions, no rales, no wheezes, no rhonchi    Cardiovascular:  Regular rate and rhythm, no rubs or murmur, normal PMI    Gastrointestinal: Soft, non-tender, bowel sound normal    Extremities: No clubbing, cyanosis, or pedal edema    Vascular:  DP pulse normal    Neurological: alert & oriented x 3, sensation intact, deep reflexes intact, cranial nerve intact, normal strength    Skin: warm and dry, normal color    Lymph Nodes: normal posterior cervical lymph node, normal anterior cervical lymph node, normal supraclavicular lymph node    Musculoskeletal: BLE edema  ROM intact, warmth, or calf tenderness. Normal strength    Psychiatric: normal affect, normal behavior

## 2025-03-28 NOTE — OB PST NOTE - PAIN CHRONIC, PROFILE
[employer]*  (Call [facility]/211 if you need more resources.)  [employer]*  (Call [facility]/211 if you need more resources.)    [employer] Financial Assistance  They offer: help uninsured patients who do not qualify for government-sponsored health insurance and cannot afford to pay for their medical care. Insured patients may also qualify depending on family income, family size, and medical needs.   Contact: [phone]   Helpful Info: How to apply-  Option 1: Fill out the Financial Assistance Application(FAP). Copies of the Financial Assistance Application and the FAP may be obtained for free by calling the [employer] service department at [phone].   Option 2: download a copy from the [employer] website: [email or web address]     [employer]    Eligibility Assistance: [phone]    [employer]  They offer: medication cost assistance, personal care items, and small durable medical equipment to low income and uninsured patients with chronic health conditions.   Contact: [phone] or [phone]     Wellness Outreach  They offer: connections to financial assistance for social and medical services for low income and uninsured persons.   Contact: [phone] (leave message with name and contact number if no answer).    Utility Assistance     [facility] Low Income Home Energy Assistance Program  They offer: energy assistance.  Contact: [phone]; [email or web address]  Helpful Info: Must be a [place] resident and need financial assistance with home energy costs.    [employer]   They offer: wide range of services to low and moderate-income residents in [facility]  Contact: [phone]; [address] [place] [address]    [facility]   They offer: basic needs for stability and support services.   Contact: [phone];  no

## 2025-03-28 NOTE — OB PST NOTE - TOBACCO USE
Contraction pain is mild.  Epidural No    VS:   Vitals:    08/13/22 1134 08/13/22 1138 08/13/22 1143 08/13/22 1355   BP:    118/67   Pulse:    80   Resp:    18   Temp:    98.5 °F (36.9 °C)   TempSrc:    Oral   SpO2:  97% 99%    Weight: 206 lb (93.4 kg)      Height: 5' 10\" (1.778 m)            FHT: Cat 1    Cervical Exam: 3/50%/-3/posterior, AROM with clear AF  \    A/P: 27 y.o. [id number] 39w5d   Labor induction: progressing well, will start pitocin when able to   Fetus: reassuring  GBS: neg Never smoker

## 2025-03-30 ENCOUNTER — INPATIENT (INPATIENT)
Facility: HOSPITAL | Age: 35
LOS: 1 days | Discharge: HOME | End: 2025-04-01
Attending: STUDENT IN AN ORGANIZED HEALTH CARE EDUCATION/TRAINING PROGRAM | Admitting: STUDENT IN AN ORGANIZED HEALTH CARE EDUCATION/TRAINING PROGRAM
Payer: COMMERCIAL

## 2025-03-30 VITALS
HEART RATE: 112 BPM | DIASTOLIC BLOOD PRESSURE: 94 MMHG | SYSTOLIC BLOOD PRESSURE: 150 MMHG | RESPIRATION RATE: 15 BRPM | TEMPERATURE: 98 F

## 2025-03-30 DIAGNOSIS — O26.899 OTHER SPECIFIED PREGNANCY RELATED CONDITIONS, UNSPECIFIED TRIMESTER: ICD-10-CM

## 2025-03-30 DIAGNOSIS — O42.90 PREMATURE RUPTURE OF MEMBRANES, UNSPECIFIED AS TO LENGTH OF TIME BETWEEN RUPTURE AND ONSET OF LABOR, UNSPECIFIED WEEKS OF GESTATION: ICD-10-CM

## 2025-03-30 DIAGNOSIS — Z98.891 HISTORY OF UTERINE SCAR FROM PREVIOUS SURGERY: Chronic | ICD-10-CM

## 2025-03-30 LAB
ALBUMIN SERPL ELPH-MCNC: 3.5 G/DL — SIGNIFICANT CHANGE UP (ref 3.3–5)
ALP SERPL-CCNC: 170 U/L — HIGH (ref 40–120)
ALT FLD-CCNC: 14 U/L — SIGNIFICANT CHANGE UP (ref 4–33)
ANION GAP SERPL CALC-SCNC: 13 MMOL/L — SIGNIFICANT CHANGE UP (ref 7–14)
APPEARANCE UR: CLEAR — SIGNIFICANT CHANGE UP
AST SERPL-CCNC: 21 U/L — SIGNIFICANT CHANGE UP (ref 4–32)
BACTERIA # UR AUTO: NEGATIVE /HPF — SIGNIFICANT CHANGE UP
BASOPHILS # BLD AUTO: 0.03 K/UL — SIGNIFICANT CHANGE UP (ref 0–0.2)
BASOPHILS NFR BLD AUTO: 0.2 % — SIGNIFICANT CHANGE UP (ref 0–2)
BILIRUB SERPL-MCNC: 0.2 MG/DL — SIGNIFICANT CHANGE UP (ref 0.2–1.2)
BILIRUB UR-MCNC: NEGATIVE — SIGNIFICANT CHANGE UP
BLD GP AB SCN SERPL QL: NEGATIVE — SIGNIFICANT CHANGE UP
BUN SERPL-MCNC: 9 MG/DL — SIGNIFICANT CHANGE UP (ref 7–23)
CALCIUM SERPL-MCNC: 9.3 MG/DL — SIGNIFICANT CHANGE UP (ref 8.4–10.5)
CAST: 0 /LPF — SIGNIFICANT CHANGE UP (ref 0–4)
CHLORIDE SERPL-SCNC: 103 MMOL/L — SIGNIFICANT CHANGE UP (ref 98–107)
CO2 SERPL-SCNC: 20 MMOL/L — LOW (ref 22–31)
COLOR SPEC: YELLOW — SIGNIFICANT CHANGE UP
CREAT ?TM UR-MCNC: 68 MG/DL — SIGNIFICANT CHANGE UP
CREAT SERPL-MCNC: 0.7 MG/DL — SIGNIFICANT CHANGE UP (ref 0.5–1.3)
DIFF PNL FLD: NEGATIVE — SIGNIFICANT CHANGE UP
EGFR: 116 ML/MIN/1.73M2 — SIGNIFICANT CHANGE UP
EGFR: 116 ML/MIN/1.73M2 — SIGNIFICANT CHANGE UP
EOSINOPHIL # BLD AUTO: 0.08 K/UL — SIGNIFICANT CHANGE UP (ref 0–0.5)
EOSINOPHIL NFR BLD AUTO: 0.7 % — SIGNIFICANT CHANGE UP (ref 0–6)
GLUCOSE SERPL-MCNC: 104 MG/DL — HIGH (ref 70–99)
GLUCOSE UR QL: NEGATIVE MG/DL — SIGNIFICANT CHANGE UP
HCT VFR BLD CALC: 39.3 % — SIGNIFICANT CHANGE UP (ref 34.5–45)
HGB BLD-MCNC: 12.9 G/DL — SIGNIFICANT CHANGE UP (ref 11.5–15.5)
IANC: 10.49 K/UL — HIGH (ref 1.8–7.4)
IMM GRANULOCYTES NFR BLD AUTO: 0.7 % — SIGNIFICANT CHANGE UP (ref 0–0.9)
KETONES UR-MCNC: ABNORMAL MG/DL
LDH SERPL L TO P-CCNC: 169 U/L — SIGNIFICANT CHANGE UP (ref 135–225)
LEUKOCYTE ESTERASE UR-ACNC: ABNORMAL
LYMPHOCYTES # BLD AUTO: 0.79 K/UL — LOW (ref 1–3.3)
LYMPHOCYTES # BLD AUTO: 6.5 % — LOW (ref 13–44)
MCHC RBC-ENTMCNC: 23.6 PG — LOW (ref 27–34)
MCHC RBC-ENTMCNC: 32.8 G/DL — SIGNIFICANT CHANGE UP (ref 32–36)
MCV RBC AUTO: 72 FL — LOW (ref 80–100)
MONOCYTES # BLD AUTO: 0.77 K/UL — SIGNIFICANT CHANGE UP (ref 0–0.9)
MONOCYTES NFR BLD AUTO: 6.3 % — SIGNIFICANT CHANGE UP (ref 2–14)
NEUTROPHILS # BLD AUTO: 10.49 K/UL — HIGH (ref 1.8–7.4)
NEUTROPHILS NFR BLD AUTO: 85.6 % — HIGH (ref 43–77)
NITRITE UR-MCNC: NEGATIVE — SIGNIFICANT CHANGE UP
NRBC # BLD AUTO: 0 K/UL — SIGNIFICANT CHANGE UP (ref 0–0)
NRBC # FLD: 0 K/UL — SIGNIFICANT CHANGE UP (ref 0–0)
NRBC BLD AUTO-RTO: 0 /100 WBCS — SIGNIFICANT CHANGE UP (ref 0–0)
PH UR: 6.5 — SIGNIFICANT CHANGE UP (ref 5–8)
PLATELET # BLD AUTO: 225 K/UL — SIGNIFICANT CHANGE UP (ref 150–400)
POTASSIUM SERPL-MCNC: 4 MMOL/L — SIGNIFICANT CHANGE UP (ref 3.5–5.3)
POTASSIUM SERPL-SCNC: 4 MMOL/L — SIGNIFICANT CHANGE UP (ref 3.5–5.3)
PROT ?TM UR-MCNC: 14 MG/DL — SIGNIFICANT CHANGE UP
PROT SERPL-MCNC: 6.8 G/DL — SIGNIFICANT CHANGE UP (ref 6–8.3)
PROT UR-MCNC: NEGATIVE MG/DL — SIGNIFICANT CHANGE UP
PROT/CREAT UR-RTO: 0.2 RATIO — SIGNIFICANT CHANGE UP (ref 0–0.2)
RBC # BLD: 5.46 M/UL — HIGH (ref 3.8–5.2)
RBC # FLD: 14.6 % — HIGH (ref 10.3–14.5)
RBC CASTS # UR COMP ASSIST: 5 /HPF — HIGH (ref 0–4)
REVIEW: SIGNIFICANT CHANGE UP
RH IG SCN BLD-IMP: POSITIVE — SIGNIFICANT CHANGE UP
SODIUM SERPL-SCNC: 136 MMOL/L — SIGNIFICANT CHANGE UP (ref 135–145)
SP GR SPEC: 1.01 — SIGNIFICANT CHANGE UP (ref 1–1.03)
SQUAMOUS # UR AUTO: 5 /HPF — SIGNIFICANT CHANGE UP (ref 0–5)
URATE SERPL-MCNC: 5.9 MG/DL — SIGNIFICANT CHANGE UP (ref 2.5–7)
UROBILINOGEN FLD QL: 0.2 MG/DL — SIGNIFICANT CHANGE UP (ref 0.2–1)
WBC # BLD: 12.24 K/UL — HIGH (ref 3.8–10.5)
WBC # FLD AUTO: 12.24 K/UL — HIGH (ref 3.8–10.5)
WBC UR QL: 13 /HPF — HIGH (ref 0–5)

## 2025-03-30 PROCEDURE — 93010 ELECTROCARDIOGRAM REPORT: CPT

## 2025-03-30 RX ORDER — PRENATAL 136/IRON/FOLIC ACID 27 MG-1 MG
1 TABLET ORAL DAILY
Refills: 0 | Status: DISCONTINUED | OUTPATIENT
Start: 2025-03-30 | End: 2025-04-01

## 2025-03-30 RX ORDER — BENZOCAINE 220 MG/G
1 SPRAY, METERED PERIODONTAL EVERY 6 HOURS
Refills: 0 | Status: DISCONTINUED | OUTPATIENT
Start: 2025-03-30 | End: 2025-04-01

## 2025-03-30 RX ORDER — OXYTOCIN-SODIUM CHLORIDE 0.9% IV SOLN 30 UNIT/500ML 30-0.9/5 UT/ML-%
167 SOLUTION INTRAVENOUS
Qty: 30 | Refills: 0 | Status: DISCONTINUED | OUTPATIENT
Start: 2025-03-30 | End: 2025-03-31

## 2025-03-30 RX ORDER — PRAMOXINE HCL 1 %
1 GEL (GRAM) TOPICAL EVERY 4 HOURS
Refills: 0 | Status: DISCONTINUED | OUTPATIENT
Start: 2025-03-30 | End: 2025-04-01

## 2025-03-30 RX ORDER — DIBUCAINE 10 MG/G
1 OINTMENT TOPICAL EVERY 6 HOURS
Refills: 0 | Status: DISCONTINUED | OUTPATIENT
Start: 2025-03-30 | End: 2025-04-01

## 2025-03-30 RX ORDER — IBUPROFEN 200 MG
600 TABLET ORAL EVERY 6 HOURS
Refills: 0 | Status: COMPLETED | OUTPATIENT
Start: 2025-03-30 | End: 2026-02-26

## 2025-03-30 RX ORDER — SODIUM CHLORIDE 9 G/1000ML
500 INJECTION, SOLUTION INTRAVENOUS ONCE
Refills: 0 | Status: COMPLETED | OUTPATIENT
Start: 2025-03-30 | End: 2025-03-30

## 2025-03-30 RX ORDER — WITCH HAZEL LEAF
1 FLUID EXTRACT MISCELLANEOUS EVERY 4 HOURS
Refills: 0 | Status: DISCONTINUED | OUTPATIENT
Start: 2025-03-30 | End: 2025-04-01

## 2025-03-30 RX ORDER — HYDROCORTISONE 10 MG/G
1 CREAM TOPICAL EVERY 6 HOURS
Refills: 0 | Status: DISCONTINUED | OUTPATIENT
Start: 2025-03-30 | End: 2025-04-01

## 2025-03-30 RX ORDER — SIMETHICONE 80 MG
80 TABLET,CHEWABLE ORAL EVERY 4 HOURS
Refills: 0 | Status: DISCONTINUED | OUTPATIENT
Start: 2025-03-30 | End: 2025-04-01

## 2025-03-30 RX ORDER — SODIUM CHLORIDE 9 G/1000ML
500 INJECTION, SOLUTION INTRAVENOUS ONCE
Refills: 0 | Status: DISCONTINUED | OUTPATIENT
Start: 2025-03-30 | End: 2025-03-31

## 2025-03-30 RX ORDER — SODIUM CHLORIDE 9 G/1000ML
1000 INJECTION, SOLUTION INTRAVENOUS
Refills: 0 | Status: DISCONTINUED | OUTPATIENT
Start: 2025-03-30 | End: 2025-03-30

## 2025-03-30 RX ORDER — MODIFIED LANOLIN 100 %
1 CREAM (GRAM) TOPICAL EVERY 6 HOURS
Refills: 0 | Status: DISCONTINUED | OUTPATIENT
Start: 2025-03-30 | End: 2025-04-01

## 2025-03-30 RX ORDER — KETOROLAC TROMETHAMINE 30 MG/ML
30 INJECTION, SOLUTION INTRAMUSCULAR; INTRAVENOUS ONCE
Refills: 0 | Status: DISCONTINUED | OUTPATIENT
Start: 2025-03-30 | End: 2025-03-31

## 2025-03-30 RX ORDER — DIPHENHYDRAMINE HCL 12.5MG/5ML
25 ELIXIR ORAL EVERY 6 HOURS
Refills: 0 | Status: DISCONTINUED | OUTPATIENT
Start: 2025-03-30 | End: 2025-04-01

## 2025-03-30 RX ORDER — OXYCODONE HYDROCHLORIDE 30 MG/1
5 TABLET ORAL ONCE
Refills: 0 | Status: DISCONTINUED | OUTPATIENT
Start: 2025-03-30 | End: 2025-04-01

## 2025-03-30 RX ORDER — MAGNESIUM HYDROXIDE 400 MG/5ML
30 SUSPENSION ORAL
Refills: 0 | Status: DISCONTINUED | OUTPATIENT
Start: 2025-03-30 | End: 2025-04-01

## 2025-03-30 RX ORDER — ACETAMINOPHEN 500 MG/5ML
975 LIQUID (ML) ORAL
Refills: 0 | Status: DISCONTINUED | OUTPATIENT
Start: 2025-03-30 | End: 2025-04-01

## 2025-03-30 RX ORDER — CLOSTRIDIUM TETANI TOXOID ANTIGEN (FORMALDEHYDE INACTIVATED), CORYNEBACTERIUM DIPHTHERIAE TOXOID ANTIGEN (FORMALDEHYDE INACTIVATED), BORDETELLA PERTUSSIS TOXOID ANTIGEN (GLUTARALDEHYDE INACTIVATED), BORDETELLA PERTUSSIS FILAMENTOUS HEMAGGLUTININ ANTIGEN (FORMALDEHYDE INACTIVATED), BORDETELLA PERTUSSIS PERTACTIN ANTIGEN, AND BORDETELLA PERTUSSIS FIMBRIAE 2/3 ANTIGEN 5; 2; 2.5; 5; 3; 5 [LF]/.5ML; [LF]/.5ML; UG/.5ML; UG/.5ML; UG/.5ML; UG/.5ML
0.5 INJECTION, SUSPENSION INTRAMUSCULAR ONCE
Refills: 0 | Status: DISCONTINUED | OUTPATIENT
Start: 2025-03-30 | End: 2025-04-01

## 2025-03-30 RX ORDER — OXYTOCIN-SODIUM CHLORIDE 0.9% IV SOLN 30 UNIT/500ML 30-0.9/5 UT/ML-%
SOLUTION INTRAVENOUS
Qty: 30 | Refills: 0 | Status: DISCONTINUED | OUTPATIENT
Start: 2025-03-30 | End: 2025-03-30

## 2025-03-30 RX ORDER — OXYCODONE HYDROCHLORIDE 30 MG/1
5 TABLET ORAL
Refills: 0 | Status: DISCONTINUED | OUTPATIENT
Start: 2025-03-30 | End: 2025-04-01

## 2025-03-30 RX ADMIN — SODIUM CHLORIDE 16.67 MILLILITER(S): 9 INJECTION, SOLUTION INTRAVENOUS at 18:40

## 2025-03-30 RX ADMIN — Medication 3 MILLILITER(S): at 22:00

## 2025-03-30 RX ADMIN — SODIUM CHLORIDE 1500 MILLILITER(S): 9 INJECTION, SOLUTION INTRAVENOUS at 10:58

## 2025-03-30 NOTE — OB POSTPARTUM EVENT NOTE - NS_EVENTSUMMARY1_OBGYN_ALL_OB_FT
Pt is VAVD  from 171.  Qbl 188m, lochia is light, uterus is firm.  Pt is eating and breast feeding baby but presenting with a HR of 130s.  Pt states she does not feel her heart racing just that she is tired.  RN palpating radial pulse, Hr136.  CNM Venus Linton made aware

## 2025-03-30 NOTE — OB PROVIDER LABOR PROGRESS NOTE - NS_SUBJECTIVE/OBJECTIVE_OBGYN_ALL_OB_FT
Patient examined for cervical change after epidural placed    FHTs moderate variability with contractions q 2-4 mins    cx: 6/90/-1    40+2 weeks srom in labor  -minimal change since last exam,  will repeat in 2-3 hours and if remains unchanged place IUPC and consider Pitocin augmentation     TOLAC  -risks reviewed again at bedside including rupture 1/100 bleeding, loss of uterus among others  Patient understands and wishes to proceed

## 2025-03-30 NOTE — OB RN PATIENT PROFILE - NS PRO PT RIGHT SUPPORT PERSON
Please continue remaining cardiac medications including aspirin 81 mg, atorvastatin 10 mg daily, chlorthalidone 25 mg daily, valsartan 40 mg twice daily, propranolol 80 mg daily.    We will obtain a transthoracic echocardiogram for structural evaluation including ejection fraction, assessment of regional wall motion abnormalities or valvular disease, and further evaluation of hemodynamics.    Please followup with me in Cardiology clinic within the next 1 year.  Please return to clinic sooner or seek emergent care if your symptoms reoccur or worsen.   Declines

## 2025-03-30 NOTE — OB PROVIDER H&P - HISTORY OF PRESENT ILLNESS
34yo female  @ 40.2 wks SLIUP uncomp PNC here complaining of leaking of clear fluid since 9am with lower abdominal cramping. Pt was admitted on  for  labor and received betamethasone. Pt is a previous  for breech presentation at 6cm. Pt desires TOLAC.    PNC complicated by  labor and received betamethasone. GBS negative as of 3/21/2025

## 2025-03-30 NOTE — OB PROVIDER H&P - LABOR: CERVICAL EFFACEMENT
Quality 110: Preventive Care And Screening: Influenza Immunization: Influenza Immunization Administered during Influenza season Quality 431: Preventive Care And Screening: Unhealthy Alcohol Use - Screening: Patient screened for unhealthy alcohol use using a single question and scores less than 2 times per year Detail Level: Detailed Quality 47: Advance Care Plan: Advance care planning not documented, reason not otherwise specified. Quality 111:Pneumonia Vaccination Status For Older Adults: Pneumococcal Vaccination Previously Received Quality 128: Preventive Care And Screening: Body Mass Index (Bmi) Screening And Follow-Up Plan: BMI not documented, reason not otherwise specified. Quality 130: Documentation Of Current Medications In The Medical Record: Current Medications Documented Quality 226: Preventive Care And Screening: Tobacco Use: Screening And Cessation Intervention: Patient screened for tobacco use and is an ex/non-smoker Greater than 75%

## 2025-03-30 NOTE — PROVIDER CONTACT NOTE (OTHER) - SITUATION
Patient P/S VAVD  patient tachycardic to the 130s EKG done. Upon attempt of orthos patient still tachy to the 115 113

## 2025-03-30 NOTE — OB PROVIDER H&P - NSPRENATALGBS_OBGYN_ALL_OB_START_DATE
- BC,UC NGTD  - Chest X-ray with pulmonary congestion, lasix given  - RIP negative  - CT C/A/P suggestive of cholecystitis. Also showing ascites and bilat pleural effusions. RUQ U/S performed and likewise concerning for acute em.  - Gen surg consulted. No surgical intervention planned given neutropenia. rec'd HIDA and IR cx for possible biliary drain placement. HIDA performed and IR consulted.  - acute cholecystostomy drain placed   - ID consulted 12/21. Completed daptomycin, micafungin, and zosyn on 1/5 and completed empiric acyclovir on 1/1 and resumed ppx per ID rec. ID signed off.    21-Mar-2025

## 2025-03-30 NOTE — OB PROVIDER DELIVERY SUMMARY - NSSELHIDDEN_OBGYN_ALL_OB_FT
[NS_DeliveryAttending1_OBGYN_ALL_OB_FT:[id number]],[NS_DeliveryRN_OBGYN_ALL_OB_FT:[id number]=]

## 2025-03-30 NOTE — OB PROVIDER H&P - ABORTIONS, OB PROFILE
----- Message from Po Park MD sent at 10/1/2022  8:42 AM CDT -----  Regarding: Labs  Please let patient know she needs to continue taking B12 and folic acid 3 times a week.  Iron studies are adequate, no need to start any iron replacement at present.  Thank you     0

## 2025-03-30 NOTE — OB RN TRIAGE NOTE - FALL HARM RISK - UNIVERSAL INTERVENTIONS
Bed in lowest position, wheels locked, appropriate side rails in place/Call bell, personal items and telephone in reach/Instruct patient to call for assistance before getting out of bed or chair/Non-slip footwear when patient is out of bed/[place] to call system/Physically safe environment - no spills, clutter or unnecessary equipment/Purposeful Proactive Rounding/Room/bathroom lighting operational, light cord in reach
negative

## 2025-03-30 NOTE — OB RN PATIENT PROFILE - FALL HARM RISK - FACTORS NURSING JUDGEMENT
FESTUS BLAND    LVS 2E 289 D    Patient is a 79y old  Male who presents with a chief complaint of FTT, empyema r/o (26 Aug 2021 03:21)       Allergies    No Known Allergies    Intolerances        HPI:  Patient is a 79M with a PMH of HTN, DM, HLD, dementia,  Parkinson's disease, CAD s/p stents x2 (>20 years ago), CHF (EF: 50%, Moderate diastolic dysfunction (Stage II), moderate pulmonary hypertension, small-mod pericardial effusion), Lt pleural effusion, BPH, gastric ulcers, COVID infection who was sent to the ED for weakness.  Patient currently awake and alert, oriented x1, unable to provide history.  Has no acute complaints.  Discharged from NH today and was sent to the ED as he appeared weak and emaciated.  Vitals stable, labs show leukocytosis and mild lactic acidosis.  CT reveals a chronic loculated L pleural effusion.  ED attending spoke to family who made the patient DNR/DNI however did agree for chest tube drainage for effusion.  Will admit to med surg.   (26 Aug 2021 03:21)      PAST MEDICAL & SURGICAL HISTORY:  Hypercholesterolemia    DM (Diabetes Mellitus)    HTN (Hypertension)    CAD (Coronary Artery Disease)  s/p cardiac stent ( &gt; 20 years ago)    BPH (benign prostatic hyperplasia)    Coronary Stent  x 2 ( 20 years )        FAMILY HISTORY:  FH: HTN (hypertension)          MEDICATIONS   atorvastatin 10 milliGRAM(s) Oral at bedtime  carbidopa/levodopa  25/100 1 Tablet(s) Oral three times a day  clopidogrel Tablet 75 milliGRAM(s) Oral daily  dextrose 40% Gel 15 Gram(s) Oral once  dextrose 5%. 1000 milliLiter(s) IV Continuous <Continuous>  dextrose 5%. 1000 milliLiter(s) IV Continuous <Continuous>  dextrose 50% Injectable 25 Gram(s) IV Push once  dextrose 50% Injectable 12.5 Gram(s) IV Push once  dextrose 50% Injectable 25 Gram(s) IV Push once  enoxaparin Injectable 40 milliGRAM(s) SubCutaneous daily  glucagon  Injectable 1 milliGRAM(s) IntraMuscular once  insulin lispro (ADMELOG) corrective regimen sliding scale   SubCutaneous three times a day before meals  oxyCODONE    IR 5 milliGRAM(s) Oral every 6 hours PRN  piperacillin/tazobactam IVPB.. 3.375 Gram(s) IV Intermittent every 8 hours  polyethylene glycol 3350 17 Gram(s) Oral daily PRN  QUEtiapine 50 milliGRAM(s) Oral at bedtime  senna 2 Tablet(s) Oral at bedtime  tamsulosin 0.4 milliGRAM(s) Oral at bedtime      Vital Signs Last 24 Hrs  T(C): 36.1 (26 Aug 2021 06:13), Max: 37.2 (25 Aug 2021 19:40)  T(F): 97 (26 Aug 2021 06:13), Max: 98.9 (25 Aug 2021 19:40)  HR: 75 (26 Aug 2021 06:13) (68 - 94)  BP: 112/47 (26 Aug 2021 06:13) (104/48 - 128/53)  BP(mean): --  RR: 20 (26 Aug 2021 06:13) (20 - 25)  SpO2: 97% (26 Aug 2021 06:13) (96% - 97%)            LABS:                        10.3   19.54 )-----------( 528      ( 25 Aug 2021 21:05 )             32.7         137  |  103  |  36<H>  ----------------------------<  157<H>  4.9   |  29  |  0.92    Ca    8.9      25 Aug 2021 21:05    TPro  7.2  /  Alb  2.2<L>  /  TBili  0.5  /  DBili  x   /  AST  4<L>  /  ALT  <6<L>  /  AlkPhos  86        Urinalysis Basic - ( 25 Aug 2021 22:31 )    Color: Yellow / Appearance: Clear / S.010 / pH: x  Gluc: x / Ketone: Negative  / Bili: Negative / Urobili: 1 mg/dL   Blood: x / Protein: Negative mg/dL / Nitrite: Negative   Leuk Esterase: Negative / RBC: 0-2 /HPF / WBC x   Sq Epi: x / Non Sq Epi: x / Bacteria: x        ABG - ( 25 Aug 2021 21:48 )  pH, Arterial: x     pH, Blood: 7.45  /  pCO2: 37    /  pO2: 78    / HCO3: 25    / Base Excess: 1.7   /  SaO2: 95                  WBC:  WBC Count: 19.54 K/uL ( @ 21:05)      MICROBIOLOGY:  RECENT CULTURES:        CARDIAC MARKERS ( 26 Aug 2021 01:14 )  <.015 ng/mL / x     / x     / x     / x      CARDIAC MARKERS ( 25 Aug 2021 21:05 )  x     / x     / 13 U/L / x     / x                Sodium:  Sodium, Serum: 137 mmol/L ( @ 21:05)      0.92 mg/dL  @ 21:05      Hemoglobin:  Hemoglobin: 10.3 g/dL ( @ 21:05)      Platelets: Platelet Count - Automated: 528 K/uL ( @ 21:05)      LIVER FUNCTIONS - ( 25 Aug 2021 21:05 )  Alb: 2.2 g/dL / Pro: 7.2 gm/dL / ALK PHOS: 86 U/L / ALT: <6 U/L / AST: 4 U/L / GGT: x             Urinalysis Basic - ( 25 Aug 2021 22:31 )    Color: Yellow / Appearance: Clear / S.010 / pH: x  Gluc: x / Ketone: Negative  / Bili: Negative / Urobili: 1 mg/dL   Blood: x / Protein: Negative mg/dL / Nitrite: Negative   Leuk Esterase: Negative / RBC: 0-2 /HPF / WBC x   Sq Epi: x / Non Sq Epi: x / Bacteria: x        RADIOLOGY & ADDITIONAL STUDIES:   No

## 2025-03-30 NOTE — OB PROVIDER DELIVERY SUMMARY - NSPROVIDERDELIVERYNOTE_OBGYN_ALL_OB_FT
Pt with prolonged decelerations while pushing. Vacummn assisted delivery was recommended and verbal consent was obtained. Peds present. Vaginal delivery of liveborn infant from OA position over 3 pulls with 1 pop off. . Head, shoulders, and body delivered easily. Heavy meconium noted after delivery of infant. Infant passed to mother. Cord was clamped and cut. Placenta delivered spontaneously, noted to be intact. Uterine sweep was negative without defect noted. Fundal massage was given and uterine fundus was found to be firm. Vaginal exam revealed intact cervix, sulci, vaginal walls. Perineum with second degree laceration, repaired in standard fashion with chromic suture. Excellent hemostasis was noted. Patient stable. Count correct x 2.

## 2025-03-30 NOTE — OB RN PATIENT PROFILE - FUNCTIONAL ASSESSMENT - DAILY ACTIVITY 3.
Dilation and Curettage (D&C): What to Expect at Home  Your Recovery  Dilation and curettage (D&C) is a procedure to remove tissue from the inside of the uterus. The doctor used a curved tool, called a curette, to gently scrape tissue from your uterus.  You are likely to have a backache, or cramps similar to menstrual cramps, and pass small clots of blood from your vagina for the first few days. You may continue to have light vaginal bleeding for several weeks after the procedure.  You will probably be able to go back to most of your normal activities in 1 or 2 days.  This care sheet gives you a general idea about how long it will take for you to recover. But each person recovers at a different pace. Follow the steps below to get better as quickly as possible.  How can you care for yourself at home?  Activity  Rest when you feel tired. Getting enough sleep will help you recover.  Avoid strenuous activities, such as bicycle riding, jogging, weight lifting, or aerobic exercise, until your doctor says it is okay.  Most women are able to return to work the day after the procedure.  You may have some light vaginal bleeding. Wear sanitary pads if needed. Do not douche or use tampons for 2 weeks or until your doctor says it is okay.  Ask your doctor when it is okay for you to have sex.  If you could become pregnant, talk about birth control with your doctor. Do not try to become pregnant until your doctor says it is okay.  Diet  You can eat your normal diet. If your stomach is upset, try bland, low-fat foods like plain rice, broiled chicken, toast, and yogurt.  Drink plenty of fluids (unless your doctor tells you not to).  Medicines  Take pain medicines exactly as directed.  If the doctor gave you a prescription medicine for pain, take it as prescribed.  If you are not taking a prescription pain medicine, ask your doctor if you can take an over-the-counter medicine.  If you think your pain medicine is making you sick to 
4 = No assist / stand by assistance

## 2025-03-30 NOTE — PROVIDER CONTACT NOTE (OTHER) - RECOMMENDATIONS
MD Pina made aware. Patient received 500 bolus on previous shift. Patient to receive 500 more bolus at this time. If patient sustains the 100 teens patient is cleared to go to postpartum

## 2025-03-30 NOTE — OB RN DELIVERY SUMMARY - NS_SEPSISRSKCALC_OBGYN_ALL_OB_FT
EOS calculated successfully. EOS Risk Factor: 0.5/1000 live births ([facility] national incidence); GA=40w2d; Temp=98.6; ROM=8.3; GBS='Negative'; Antibiotics='No antibiotics or any antibiotics < 2 hrs prior to birth'

## 2025-03-30 NOTE — OB RN PATIENT PROFILE - NSSDOHFOODLAST_OBGYN_A_OB
Anesthesia Start and Stop Event Times     Date Time Event    6/20/2023 1158 Ready for Procedure     1208 Anesthesia Start     1306 Anesthesia Stop        Responsible Staff  06/20/23    Name Role Begin End    Cris Ma M.D. Anesth 1208 1306        Overtime Reason:  no overtime (within assigned shift)    Comments:                                                      
never true

## 2025-03-30 NOTE — OB RN DELIVERY SUMMARY - NSSELHIDDEN_OBGYN_ALL_OB_FT
[NS_DeliveryAttending1_OBGYN_ALL_OB_FT:[id number]],[NS_DeliveryRN_OBGYN_ALL_OB_FT:[id number]=]

## 2025-03-30 NOTE — OB PROVIDER H&P - ASSESSMENT
34yo female  @ 40.2 wks SLIUP with confirmed rupture of clear membranes at 5.5 cm  -pt was admitted and discussed with Dr Vines  -BP elevated in EMTALA, baseline HELLP labs sent  -NST cat II with variables, pt repositioned and IV bolus ordered  -GBS negative  -pt desires TOLAC  -pt approved for epidural  -Risks and benefits, alternatives, and possible complications have been discussed in detail with pt in her native language. Preadmission admission , and post admission procedures and expectations were discussed in detail. All questions answered, all appropriate hospital consents were signed. Anticipate normal vaginal delivery.  -Informed consent was obtained. The following were discussed:    -induction/augmentation of labor: use of medication and/or cook balloon to begin or enhance labor    -obstetrical management including internal fetal/contraction monitoring

## 2025-03-30 NOTE — OB RN PATIENT PROFILE - NSLDARRIVAL_OBGYN_ALL_OB_START_DATE
My signature below certifies that the above stated patient is homebound and upon completion of the Face-To-Face encounter, has the need for intermittent skilled nursing, physical therapy and/or speech or occupational therapy services in their home for their current diagnosis as outlined in their initial plan of care. These services will continue to be monitored by myself or another physician. 30-Mar-2025 10:27 22-Jul-2023 20:50

## 2025-03-30 NOTE — OB NEONATOLOGY/PEDIATRICIAN DELIVERY SUMMARY - NSPEDSNEONOTESA_OBGYN_ALL_OB_FT
Peds called to LDR for cat 2 tracing . 40+2 wk AGA female born via VAVD to a 36 y/o  mother. No significant maternal or prenatal history. Maternal labs include Blood Type B+, HIV - , RPR NR , Rubella I , Hep B - , GBS - on 3/21. SROM with clear fluids (ROM hours: approx 8 hrs). Highest maternal temp: 37C. EOS 0.13 . Baby emerged vigorous, crying, was warmed, dried, suctioned, and stimulated with APGARS of 9/9. Resuscitation included: bulb suction and stimulation. Mom plans to initiate breastfeeding, consents Hep B vaccine.    : 3/30/25  TOB: 17:18  BW: 3120 g    Physical Exam (Post-Delivery)  Gen: NAD; well-appearing  HEENT: +soft swelling of head; NC/AT; anterior fontanelle open and flat; ears and nose clinically patent, normally set; no tags, no cleft palate appreciated  Skin: pink, warm, well-perfused, no rash  Resp: non-labored breathing  Abd: soft, NT/ND; no masses appreciated, umbilical cord with 3 vessels  Extremities: moving all extremities, no crepitus; hips negative O/B  MSK: no clavicular fracture appreciated  : Igor I; no abnormalities; anus patent  Back: no sacral dimple  Neuro: +jessica, +babinski, grasp, good tone throughout

## 2025-03-30 NOTE — OB PROVIDER H&P - NSHPPHYSICALEXAM_GEN_ALL_CORE
ICU Vital Signs Last 24 Hrs  T(C): 36.6 (30 Mar 2025 10:55), Max: 36.8 (30 Mar 2025 10:24)  T(F): 97.9 (30 Mar 2025 10:55), Max: 98.2 (30 Mar 2025 10:24)  HR: 93 (30 Mar 2025 10:56) (93 - 112)  BP: 131/85 (30 Mar 2025 10:56) (131/85 - 150/94)  BP(mean): --  ABP: --  ABP(mean): --  RR: 16 (30 Mar 2025 10:55) (15 - 16)  SpO2: 96% (30 Mar 2025 10:41) (96% - 96%)    General: Female sitting comfortably   Neuro: No facial asymmetry, no slurred speech, moves all 4 extremities  Mood: Alert and lucid, appropriate mood and affect  Head: Normocephalic. Atraumatic.   Eyes: No discharge, lids normal, conjunctiva normal  Lungs: No respiratory distress  Abdomen: Soft, nontender. Gravid. No contractions on palpation    NST  Baseline  (   145       ) BPM  Variability ( x )  Moderate   (  ) Minimal  (  ) Absent  (  )  Marked  Accelerations (x  ) 15x15   (  ) 10x10  (  ) no  Decelerations (  ) no  (x  ) Variable  (  ) Early  (  ) Late      Description _________  Contractions (  ) no  ( x ) yes     Description  ___Q3-6 min_______  Interpretation ( x ) reactive   (  )  non-reactive    VE-5.5/90/-2; grossly ruptured with clear fluid  Abd sono- Images saved to sono- cephalic; posterior placenta; MVP-1.13  EFW~3430g  GBS negative as of 3/21/25

## 2025-03-31 ENCOUNTER — TRANSCRIPTION ENCOUNTER (OUTPATIENT)
Age: 35
End: 2025-03-31

## 2025-03-31 LAB — T PALLIDUM AB TITR SER: NEGATIVE — SIGNIFICANT CHANGE UP

## 2025-03-31 RX ORDER — ACETAMINOPHEN 500 MG/5ML
3 LIQUID (ML) ORAL
Qty: 0 | Refills: 0 | DISCHARGE
Start: 2025-03-31

## 2025-03-31 RX ORDER — BENZOCAINE 220 MG/G
1 SPRAY, METERED PERIODONTAL
Qty: 0 | Refills: 0 | DISCHARGE
Start: 2025-03-31

## 2025-03-31 RX ORDER — MODIFIED LANOLIN 100 %
1 CREAM (GRAM) TOPICAL
Qty: 0 | Refills: 0 | DISCHARGE
Start: 2025-03-31

## 2025-03-31 RX ORDER — PRENATAL 136/IRON/FOLIC ACID 27 MG-1 MG
1 TABLET ORAL
Qty: 0 | Refills: 0 | DISCHARGE
Start: 2025-03-31

## 2025-03-31 RX ORDER — WITCH HAZEL LEAF
1 FLUID EXTRACT MISCELLANEOUS
Qty: 0 | Refills: 0 | DISCHARGE
Start: 2025-03-31

## 2025-03-31 RX ORDER — HYDROCORTISONE 10 MG/G
1 CREAM TOPICAL
Qty: 0 | Refills: 0 | DISCHARGE
Start: 2025-03-31

## 2025-03-31 RX ORDER — DIBUCAINE 10 MG/G
1 OINTMENT TOPICAL
Qty: 0 | Refills: 0 | DISCHARGE
Start: 2025-03-31

## 2025-03-31 RX ORDER — IBUPROFEN 200 MG
600 TABLET ORAL EVERY 6 HOURS
Refills: 0 | Status: DISCONTINUED | OUTPATIENT
Start: 2025-03-31 | End: 2025-04-01

## 2025-03-31 RX ORDER — PRAMOXINE HCL 1 %
1 GEL (GRAM) TOPICAL
Qty: 0 | Refills: 0 | DISCHARGE
Start: 2025-03-31

## 2025-03-31 RX ORDER — IBUPROFEN 200 MG
1 TABLET ORAL
Qty: 0 | Refills: 0 | DISCHARGE
Start: 2025-03-31

## 2025-03-31 RX ADMIN — Medication 600 MILLIGRAM(S): at 18:23

## 2025-03-31 RX ADMIN — Medication 1 TABLET(S): at 12:14

## 2025-03-31 RX ADMIN — Medication 975 MILLIGRAM(S): at 20:30

## 2025-03-31 RX ADMIN — Medication 975 MILLIGRAM(S): at 03:05

## 2025-03-31 RX ADMIN — Medication 600 MILLIGRAM(S): at 12:55

## 2025-03-31 RX ADMIN — Medication 975 MILLIGRAM(S): at 02:44

## 2025-03-31 RX ADMIN — Medication 600 MILLIGRAM(S): at 18:54

## 2025-03-31 RX ADMIN — Medication 975 MILLIGRAM(S): at 15:06

## 2025-03-31 RX ADMIN — Medication 3 MILLILITER(S): at 22:00

## 2025-03-31 RX ADMIN — Medication 600 MILLIGRAM(S): at 12:13

## 2025-03-31 RX ADMIN — Medication 975 MILLIGRAM(S): at 09:00

## 2025-03-31 RX ADMIN — Medication 975 MILLIGRAM(S): at 09:05

## 2025-03-31 RX ADMIN — Medication 975 MILLIGRAM(S): at 15:40

## 2025-03-31 RX ADMIN — Medication 975 MILLIGRAM(S): at 20:04

## 2025-03-31 RX ADMIN — Medication 3 MILLILITER(S): at 14:00

## 2025-03-31 NOTE — DISCHARGE NOTE OB - CARE PROVIDERS DIRECT ADDRESSES
[email or web address]

## 2025-03-31 NOTE — DISCHARGE NOTE OB - HOME CARE AGENCY TYPE OF SERVICE:
RN to see patient at home 4/2/25 pending insurance authorization & agency acceptance. RN to see patient at home 4/2/25.

## 2025-03-31 NOTE — DISCHARGE NOTE OB - FINANCIAL ASSISTANCE
[facility] provides services at a reduced cost to those who are determined to be eligible through [facility]’s financial assistance program. Information regarding [facility]’s financial assistance program can be found by going to [email or web address] or by calling [phone].

## 2025-03-31 NOTE — DISCHARGE NOTE OB - PLAN OF CARE
Routine Vaginal Delivery Follow-up in your OB office within 1 week for a blood pressure check.   Please take your blood pressure 3x/day. Call your doctor if your blood pressure is 140 (top number) OR 90 (bottom number) or higher. Return to hospital if your blood pressure is 160 (top number)  (bottom number) or higher. Call your doctor if you experience symptoms such as headache, blurry vision, epigastric pain, or nausea/vomiting.

## 2025-03-31 NOTE — DISCHARGE NOTE OB - CARE PROVIDER_API CALL
Jessica Garcia  Obstetrics and Gynecology  [address] [place] [id number]  Phone: [phone]  Fax: [phone]  Follow Up Time:

## 2025-03-31 NOTE — PROGRESS NOTE ADULT - ASSESSMENT
NP:  day 1 Progress Note:     Patient seen at bedside resting comfortably offers no current complaints. Ambulating and voiding without difficulty.  Passing flatus and tolerating regular diet.  Bonding well with .  Breastfeeding exclusively . Denies HA, CP, SOB, N/V/D, dizziness, palpitations,  worsening vaginal bleeding, or any other concerns.      Vital Signs Last 24 Hrs  T(C): 36.3 (31 Mar 2025 06:00), Max: 37.0 (30 Mar 2025 12:58)  T(F): 97.3 (31 Mar 2025 06:00), Max: 98.6 (30 Mar 2025 12:58)  HR: 75 (31 Mar 2025 06:00) (75 - 141)  BP: 111/69 (31 Mar 2025 06:00) (108/69 - 151/99)  BP(mean): --  RR: 18 (31 Mar 2025 06:00) (15 - 18)  SpO2: 97% (31 Mar 2025 06:00) (76% - 100%)    Parameters below as of 31 Mar 2025 06:00  Patient On (Oxygen Delivery Method): room air        Physical Exam:     Gen: A&Ox 3, NAD  Chest: CTA B/L  Cardiac: S1,S2; RRR  Breast: Soft, nontender, nonengorged  Abdomen: +BS, Soft, nontender, ND; Fundus firm below umbilicus  Gyn: mod lochia, intact/repaired  Ext: Nontender, DTRS 2+, no worsening edema                          12.9   12.24 )-----------( 225      ( 30 Mar 2025 10:58 )             39.3       A/P: 35yr old F PPD#1 s/p  3/30/2025 c/b GHTN     #GHTN   - BP x 24 hours: BP:  (108/69 - 151/99)  - Recent /69  - HELLP Labs- WNL   - P.C Ratio 0.2  - BP cuff sent to home pharmacy via [employer] EMR   - Denies S/S of PEC     #PP   - Continue pain management  - Encourage OOB and ambulation  - Continue current care  - Discharge planning     -d/w dr Nicanor Chen NP

## 2025-03-31 NOTE — DISCHARGE NOTE OB - ADDITIONAL INSTRUCTIONS
Please return to [facility] office in 1 week for BP check or as needed Follow up at [facility] in 1 week for BP monitoring  monitor BP @ home 3 times daily (morning/noon/night)  keep a strict blood pressure log and bring to the office 1 week after hospital discharge for review  if you have BP > 140/90 call the office  If you have a BP >160/100 go to the hospital  if you have headaches, vision changes, upper abdominal pain call the office

## 2025-03-31 NOTE — DISCHARGE NOTE OB - CARE PLAN
1 Principal Discharge DX:	, delivered  Assessment and plan of treatment:	Routine Vaginal Delivery  Secondary Diagnosis:	Gestational hypertension  Assessment and plan of treatment:	Follow-up in your OB office within 1 week for a blood pressure check.   Please take your blood pressure 3x/day. Call your doctor if your blood pressure is 140 (top number) OR 90 (bottom number) or higher. Return to hospital if your blood pressure is 160 (top number)  (bottom number) or higher. Call your doctor if you experience symptoms such as headache, blurry vision, epigastric pain, or nausea/vomiting.

## 2025-03-31 NOTE — DISCHARGE NOTE OB - PATIENT PORTAL LINK FT
You can access the FollowMyHealth Patient Portal offered by [facility] by registering at the following website: [email or web address]. By joining [email or web address]’s FollowMyHealth portal, you will also be able to view your health information using other applications (apps) compatible with our system.

## 2025-03-31 NOTE — DISCHARGE NOTE OB - MEDICATION SUMMARY - MEDICATIONS TO TAKE
I will START or STAY ON the medications listed below when I get home from the hospital:    ibuprofen 600 mg oral tablet  -- 1 tab(s) by mouth every 6 hours as needed for  moderate pain  -- Indication: For Pain     acetaminophen 325 mg oral tablet  -- 3 tab(s) by mouth every 6 hours as needed for  moderate pain  -- Indication: For Pain     benzocaine 20% topical spray  -- 1 Apply on skin to affected area every 6 hours As needed for Perineal discomfort  -- Indication: For Vaginal Delivery     dibucaine 1% topical ointment  -- 1 Apply on skin to affected area every 6 hours As needed Perineal discomfort  -- Indication: For Vaginal Delivery     pramoxine 1% topical cream  -- 1 Apply on skin to affected area every 4 hours As needed Moderate Pain (4-6)  -- Indication: For Vaginal Delivery     hydrocortisone 1% topical cream  -- 1 Apply on skin to affected area every 6 hours As needed Moderate Pain (4-6)  -- Indication: For Vaginal Delivery     lanolin topical ointment  -- 1 Apply on skin to affected area every 6 hours As needed nipple soreness  -- Indication: For Vaginal Delivery     witch hazel 50% topical pad  -- 1 Apply on skin to affected area every 4 hours As needed Perineal discomfort  -- Indication: For Vaginal Delivery     Prenatal Multivitamins with Folic Acid 1 mg oral tablet  -- 1 tab(s) by mouth once a day  -- Indication: For Vitamins

## 2025-03-31 NOTE — DISCHARGE NOTE OB - MATERIALS PROVIDED
Vaccinations/[facility]  Screening Program/  Immunization Record/Guide to Postpartum Care/[facility] Hearing Screen Program/Shaken Baby Prevention Handout/Birth Certificate Instructions/Tdap Vaccination (VIS Pub Date: 2012)

## 2025-04-01 VITALS
DIASTOLIC BLOOD PRESSURE: 70 MMHG | TEMPERATURE: 98 F | RESPIRATION RATE: 20 BRPM | HEART RATE: 84 BPM | SYSTOLIC BLOOD PRESSURE: 112 MMHG | OXYGEN SATURATION: 100 %

## 2025-04-01 RX ADMIN — Medication 600 MILLIGRAM(S): at 05:34

## 2025-04-01 RX ADMIN — Medication 3 MILLILITER(S): at 05:56

## 2025-04-01 RX ADMIN — Medication 600 MILLIGRAM(S): at 01:00

## 2025-04-01 RX ADMIN — Medication 600 MILLIGRAM(S): at 05:56

## 2025-04-01 RX ADMIN — Medication 600 MILLIGRAM(S): at 00:28

## 2025-04-01 NOTE — PROGRESS NOTE ADULT - SUBJECTIVE AND OBJECTIVE BOX
Post-partum Note,   She is a  35y woman who is now post-partum day: 2    Subjective:  The patient feels well.  She is ambulating.   She is tolerating regular diet.  She denies nausea and vomiting; denies fever.  She is voiding.  Her pain is controlled.  She reports normal postpartum bleeding.  She denies HA, blurry vision, vision changes.     Physical exam:    Vital Signs Last 24 Hrs  T(C): 36.4 (2025 09:45), Max: 36.6 (31 Mar 2025 22:26)  T(F): 97.6 (2025 09:45), Max: 97.9 (31 Mar 2025 22:26)  HR: 86 (2025 09:45) (78 - 93)  BP: 105/68 (2025 09:45) (100/64 - 113/78)  BP(mean): --  RR: 18 (2025 09:45) (16 - 18)  SpO2: 100% (2025 09:45) (98% - 100%)    Parameters below as of 2025 09:45  Patient On (Oxygen Delivery Method): room air        Gen: NAD  Breast: Soft, nontender, not engorged.  Abdomen: Soft, nontender, no distension , firm uterine fundus at umbilicus.  Pelvic: Normal lochia noted  Ext: No calf tenderness    LABS:                        12.9   12.24 )-----------( 225      ( 30 Mar 2025 10:58 )             39.3       Rubella status:     Allergies    No Known Allergies    Intolerances      MEDICATIONS  (STANDING):  acetaminophen     Tablet .. 975 milliGRAM(s) Oral <User Schedule>  diphtheria/tetanus/pertussis (acellular) Vaccine (Adacel) 0.5 milliLiter(s) IntraMuscular once  ibuprofen  Tablet. 600 milliGRAM(s) Oral every 6 hours  prenatal multivitamin 1 Tablet(s) Oral daily  sodium chloride 0.9% lock flush 3 milliLiter(s) IV Push every 8 hours    MEDICATIONS  (PRN):  benzocaine 20%/menthol 0.5% Spray 1 Spray(s) Topical every 6 hours PRN for Perineal discomfort  dibucaine 1% Ointment 1 Application(s) Topical every 6 hours PRN Perineal discomfort  diphenhydrAMINE 25 milliGRAM(s) Oral every 6 hours PRN Pruritus  hydrocortisone 1% Cream 1 Application(s) Topical every 6 hours PRN Moderate Pain (4-6)  lanolin Ointment 1 Application(s) Topical every 6 hours PRN nipple soreness  magnesium hydroxide Suspension 30 milliLiter(s) Oral two times a day PRN Constipation  oxyCODONE    IR 5 milliGRAM(s) Oral every 3 hours PRN Moderate to Severe Pain (4-10)  oxyCODONE    IR 5 milliGRAM(s) Oral once PRN Moderate to Severe Pain (4-10)  pramoxine 1%/zinc 5% Cream 1 Application(s) Topical every 4 hours PRN Moderate Pain (4-6)  simethicone 80 milliGRAM(s) Chew every 4 hours PRN Gas  witch hazel Pads 1 Application(s) Topical every 4 hours PRN Perineal discomfort        Assessment and Plan    PPD #2 s/p  c/b GHTN.     Doing well.  Encourage ambulation.  Encourage breastfeeding.  PP education materials provided.   PP & PPD Instructions reviewed.  gHTN counselling  PEC counselling and PEC prec reviewed  Home gHTN instructions reviewed.  ·	Follow up at [facility] in 1 week for BP monitoring  ·	monitor BP @ home 3 times daily (morning/noon/night)  ·	keep a strict blood pressure log and bring to the office 1 week after hospital discharge for review  ·	if you have BP > 140/90 call the office  ·	If you have a BP >160/100 go to the hospital  ·	if you have headaches, vision changes, upper abdominal pain call the offic  BP cuff sent to home pharmacy through [facility] EMR.    D/C home today  Follow up at [facility] in 1 week for BP monitoring  Plan reviewed with Dr. Vines.

## 2025-05-15 NOTE — OB RN TRIAGE NOTE - MENTAL HEALTH CONDITIONS/SYMPTOMS, PROFILE
Orders for dietary consult received as patient's mother notified staff that patient gets nightly TPN infusions at home. Patient's mother brought in 2 bags of TPN from home and is insistent that patient receives this tonight. This nurse notified Val Garcia NP and reached out to pharmacy to see if there's any way to get TPN tonight as we are unable to infuse TPN from home- it needs to be mixed in our pharmacy. A physicians order is needed, dietary is consulted to see patient regarding this and pharmacy does not manage. This nurse notified family and patient of this. Patient's mother requested to speak to a doctor as her daughter \"Needs her nutrition and must get it tonight\". This nurse notified her that a nurse practitioner is covering for her attending as they are not available at this time. Family not pleased with this. Clinical Manager contacted and spoke with family and patient.   none